# Patient Record
Sex: MALE | Race: WHITE
[De-identification: names, ages, dates, MRNs, and addresses within clinical notes are randomized per-mention and may not be internally consistent; named-entity substitution may affect disease eponyms.]

---

## 2018-04-27 ENCOUNTER — HOSPITAL ENCOUNTER (OUTPATIENT)
Dept: HOSPITAL 56 - MW.ED | Age: 48
LOS: 1 days | Discharge: HOME | End: 2018-04-28
Attending: SURGERY
Payer: COMMERCIAL

## 2018-04-27 DIAGNOSIS — F32.9: ICD-10-CM

## 2018-04-27 DIAGNOSIS — Z88.8: ICD-10-CM

## 2018-04-27 DIAGNOSIS — Z79.899: ICD-10-CM

## 2018-04-27 DIAGNOSIS — K35.80: Primary | ICD-10-CM

## 2018-04-27 DIAGNOSIS — I10: ICD-10-CM

## 2018-04-27 DIAGNOSIS — F41.9: ICD-10-CM

## 2018-04-27 DIAGNOSIS — E78.00: ICD-10-CM

## 2018-04-27 PROCEDURE — 96365 THER/PROPH/DIAG IV INF INIT: CPT

## 2018-04-27 PROCEDURE — 44970 LAPAROSCOPY APPENDECTOMY: CPT

## 2018-04-27 PROCEDURE — 99285 EMERGENCY DEPT VISIT HI MDM: CPT

## 2018-04-27 PROCEDURE — 96368 THER/DIAG CONCURRENT INF: CPT

## 2018-04-27 RX ADMIN — CEFOXITIN SODIUM SCH MLS/HR: 1 INJECTION, SOLUTION INTRAVENOUS at 23:01

## 2018-04-27 NOTE — PCM.PREANE
Preanesthetic Assessment





- Anesthesia/Transfusion/Family Hx


Anesthesia History: Prior Anesthesia Without Reaction


Family History of Anesthesia Reaction: No





- Review of Systems


General: No Symptoms


Pulmonary: No Symptoms


Cardiovascular: No Symptoms


Gastrointestinal: No Symptoms


Neurological: No Symptoms


Other: Reports: None





- Physical Assessment


NPO Status Date: 04/27/18


NPO Status Time: 10:00


O2 Sat by Pulse Oximetry: 97


Respiratory Rate: 15


Vital Signs: 





 Last Vital Signs











Temp  36.4 C   04/27/18 17:25


 


Pulse  88   04/27/18 17:50


 


Resp  15   04/27/18 17:50


 


BP  109/66   04/27/18 17:50


 


Pulse Ox  97   04/27/18 17:50











Height: 1.88 m


Weight: 129.274 kg


Mental Status: Alert & Oriented x3


Airway Class: Mallampati = 2


Dentition: Reports: Normal Dentition


ROM/Head Extension: Full





- Allergies


Allergies/Adverse Reactions: 


 Allergies











Allergy/AdvReac Type Severity Reaction Status Date / Time


 


codeine Allergy  Nausea and Verified 04/27/18 15:05





   Vomiting  


 


morphine Allergy  Nausea and Verified 04/27/18 15:05





   Vomiting  














- Acknowledgements


Anesthesia Type Planned: General Anesthesia


Pt an Appropriate Candidate for the Planned Anesthesia: Yes


Alternatives and Risks of Anesthesia Discussed w Pt/Guardian: Yes


Pt/Guardian Understands and Agrees with Anesthesia Plan: Yes





PreAnesthesia Questionnaire





- Past Health History


Medical/Surgical History: Denies Medical/Surgical History (Other than history 

of HTN, HLD. Surgeries included orthopedic procedures on knee.)


Cardiovascular History: Reports: None, High Cholesterol, Hypertension


Respiratory History: Reports: None


Psychiatric History: Reports: Anxiety, Depression





- Past Surgical History


Musculoskeletal Surgical History: Reports: Arthroscopic Knee





- SUBSTANCE USE


Smoking Status *Q: Never Smoker


Tobacco Use Within Last Twelve Months: Snuff/Dip


Recreational Drug Use History: No





- HOME MEDS


Home Medications: 


 Home Meds





Losartan/Hydrochlorothiazide [Losartan-HCTZ 100-25 MG] 1 tab PO DAILY 04/27/18 [

History]


Sertraline HCl 100 mg PO DAILY 04/27/18 [History]


Simvastatin [Zocor]  04/27/18 [History]


buPROPion HCl [buPROPion SR] 200 mg PO DAILY 04/27/18 [History]


lamoTRIgine [Lamotrigine] 150 mg PO DAILY 04/27/18 [History]











- CURRENT (IN HOUSE) MEDS


Current Meds: 





 Current Medications





Lactated Ringer's (Ringers, Lactated)  1,000 mls @ 125 mls/hr IV ASDIRECTED [facility]


   Last Admin: 04/27/18 15:16 Dose:  125 mls/hr


Lactated Ringer's (Ringers, Lactated)  1,000 mls @ 125 mls/hr IV ASDIRECTED [facility]


Cefoxitin Sodium 1 gm/ Premix  50 mls @ 100 mls/hr IV Q8H [facility]


   Stop: 04/28/18 15:29


Ketorolac Tromethamine (Toradol)  10 mg PO Q6H PRN


   PRN Reason: Pain (moderate 4-6)


   Stop: 05/02/18 17:29





Discontinued Medications





Bupivacaine HCl (Sensorcaine-Mpf 0.5%) Confirm Administered Dose 20 ml .ROUTE 

.STK-MED ONE


   Stop: 04/27/18 15:22


Cefazolin Sodium (Ancef) Confirm Administered Dose 1 gm .ROUTE .STK-MED ONE


   Stop: 04/27/18 15:22


Cefoxitin Sodium (Mefoxin) Confirm Administered Dose 2 gm .ROUTE .STK-MED ONE


   Stop: 04/27/18 15:37


Fentanyl (Sublimaze) Confirm Administered Dose 250 mcg .ROUTE .STK-MED ONE


   Stop: 04/27/18 15:27


Fentanyl (Sublimaze)  100 mcg .ROUTE .STK-MED PRN


   PRN Reason: Pain


Glycopyrrolate (Robinul) Confirm Administered Dose 0.2 mg .ROUTE .STK-MED ONE


   Stop: 04/27/18 15:37


Glycopyrrolate (Robinul) Confirm Administered Dose 0.2 mg .ROUTE .STK-MED ONE


   Stop: 04/27/18 16:27


Cefoxitin Sodium 2 gm/ Premix  50 mls @ 100 mls/hr IV ONETIME ONE


   Stop: 04/27/18 15:33


   Last Admin: 04/27/18 15:16 Dose:  100 mls/hr


Sodium Chloride (Normal Saline) Confirm Administered Dose 20 mls @ as directed 

.ROUTE .STK-MED ONE


   Stop: 04/27/18 15:37


Acetaminophen (Ofirmev) Confirm Administered Dose 100 mls @ as directed IV .STK-

MED ONE


   Stop: 04/27/18 17:31


Ketorolac Tromethamine (Toradol) Confirm Administered Dose 30 mg .ROUTE .STK-

MED ONE


   Stop: 04/27/18 15:37


Lidocaine (Xylocaine-Mpf 2%) Confirm Administered Dose 5 ml .ROUTE .STK-MED ONE


   Stop: 04/27/18 15:37


Midazolam HCl (Versed 1 Mg/Ml) Confirm Administered Dose 2 mg .ROUTE .STK-MED 

ONE


   Stop: 04/27/18 15:27


Ondansetron HCl (Zofran) Confirm Administered Dose 4 mg .ROUTE .STK-MED ONE


   Stop: 04/27/18 15:37


Phenylephrine HCl (Phenylephrine In Ns 100 Mcg/Ml) Confirm Administered Dose 1 

mg .ROUTE .STK-MED ONE


   Stop: 04/27/18 16:16


Phenylephrine HCl (Phenylephrine In Ns 100 Mcg/Ml) Confirm Administered Dose 1 

mg .ROUTE .STK-MED ONE


   Stop: 04/27/18 17:04


Propofol (Diprivan  20 Ml) Confirm Administered Dose 200 mg .ROUTE .STK-MED ONE


   Stop: 04/27/18 15:27


Rocuronium Bromide (Zemuron) Confirm Administered Dose 100 mg .ROUTE .STK-MED 

ONE


   Stop: 04/27/18 15:37


Succinylcholine Chloride (Quelicin) Confirm Administered Dose 200 mg .ROUTE .STK

-MED ONE


   Stop: 04/27/18 15:37

## 2018-04-27 NOTE — EDM.PDOC
<Isiah Franco REYNOLD - Last Filed: 04/27/18 15:14>





ED HPI GENERAL MEDICAL PROBLEM





- General


Chief Complaint: Abdominal Pain


Stated Complaint: ABDOMINAL PAIN


Time Seen by Provider: 04/27/18 15:10


Source of Information: Reports: Patient, EMS, Provider





- History of Present Illness


INITIAL COMMENTS - FREE TEXT/NARRATIVE: 


Mr. Mueller is a 48 yr old male who was transferred to [facility] for 

concerns of acute appendicitis. Early today, around 1am, he suddenly awoke from 

sleep with periumbilical and right lower quadrant abdominal pain. It was sharp 

and constant in nature. He presented to a local ED for evaluation. He was found 

to have a elevated WBC and findings consistent with acute appendicitis on CT 

imaging. He was sent here for surgical evaluation. Continues to have 

periumbilical and right lower quadrant pain. The pain does not radiate elsewhere

, was ggravated on the bumpy ride to our ED and is quite severe. . He denies 

appetite at this time. Some associated nausea and subjective fever/chills. No 

emesis. He does endorse some SOB of breath at with exertion. No chest pain or 

known cardiac problems. No prior abdominal surgeries. He does not take any 

anticoagulants.  He chews tobacco. PMH notable for HTN and HLD. 





  ** Abdominal


Pain Score (Numeric/FACES): 5





- Related Data


 Allergies











Allergy/AdvReac Type Severity Reaction Status Date / Time


 


codeine Allergy  Nausea and Verified 04/27/18 15:05





   Vomiting  


 


morphine Allergy  Nausea and Verified 04/27/18 15:05





   Vomiting  











Home Meds: 


 Home Meds





Losartan/Hydrochlorothiazide [Losartan-HCTZ 100-25 MG] 1 tab PO DAILY 04/27/18 [

History]


Sertraline HCl 100 mg PO DAILY 04/27/18 [History]


Simvastatin [Zocor]  04/27/18 [History]


buPROPion HCl [buPROPion SR] 200 mg PO DAILY 04/27/18 [History]


lamoTRIgine [Lamotrigine] 150 mg PO DAILY 04/27/18 [History]











Past Medical History





- Past Health History


Medical/Surgical History: Denies Medical/Surgical History (Other than history 

of HTN, HLD. Surgeries included orthopedic procedures on knee.)


Cardiovascular History: Reports: None, High Cholesterol, Hypertension


Respiratory History: Reports: None


Psychiatric History: Reports: Anxiety, Depression





- Past Surgical History


Musculoskeletal Surgical History: Reports: Arthroscopic Knee





Social & Family History





- Family History


Family Medical History: Noncontributory





- Tobacco Use


Tobacco Use Within Last Twelve Months: Snuff/Dip





- Recreational Drug Use


Recreational Drug Use: No





ED ROS GENERAL





- Review of Systems


Review Of Systems: See Below


Constitutional: Reports: Fever, Chills, Fatigue, Decreased Appetite


Respiratory: Reports: No Symptoms (No history of asthma or other pulmonary 

problems. )


Cardiovascular: Reports: No Symptoms (Denies chest pain or known cardiac 

problems. )


GI/Abdominal: Reports: Abdominal Pain, Anorexia, Other (GERD symptoms)


: Reports: No Symptoms, Other (No known kidney problems. )


Musculoskeletal: Reports: Joint Pain


Hematologic/Lymphatic: Reports: No Symptoms (No histoy of blood clots or 

bleeding disorders. No anticoagulant use. )





ED EXAM, GI/ABD





- Physical Exam


Exam: See Below


General Appearance: Alert, Other (Appearing in mild discomfort)


Head: Atraumatic, Normocephalic


Neck: Normal Inspection


Respiratory/Chest: No Respiratory Distress, Lungs Clear, Normal Breath Sounds


Cardiovascular: Regular Rate, Rhythm, No Edema


GI/Abdominal Exam: Soft, No Distention, Other (Obsese/protuberant. Tender to 

palpation in periumbilical and RLQ. No rebound. Some guarding noted. )


Extremities: Normal Inspection, No Pedal Edema


Neurological: Alert


Psychiatric: Normal Affect


Skin Exam: Warm, Dry, Normal Color





Course





- Vital Signs


Last Recorded V/S: 





 Last Vital Signs











Temp  98.1 F   04/27/18 15:02


 


Pulse  76   04/27/18 15:02


 


Resp  18   04/27/18 15:02


 


BP  101/53 L  04/27/18 15:02


 


Pulse Ox  95   04/27/18 15:02














- Orders/Labs/Meds


Orders: 





 Active Orders 24 hr











 Category Date Time Status


 


 Antiembolic Devices [RC] PER UNIT ROUTINE Care  04/27/18 15:16 Active


 


 Nathan Catheter Insertion [Insert Urinary Catheter] [OM. Care  04/27/18 15:30 

Ordered





 PC] Q24H   


 


 Oxygen Therapy [RC] PRN Care  04/27/18 15:17 Active


 


 Skin Preparation [RC] .PREOP Care  04/27/18 15:16 Active


 


 Urinary Catheter Assessment [RC] ASDIRECTED Care  04/27/18 15:16 Active


 


 Urinary Catheter Assessment [RC] ASDIRECTED Care  04/27/18 15:17 Active


 


 Vital Signs [RC] PER UNIT ROUTINE Care  04/27/18 15:16 Active


 


 Nothing Per Oral Diet [DIET] Diet  04/27/18 Breakfast Active


 


 Lactated Ringers [Ringers, Lactated] 1,000 ml Med  04/27/18 15:15 Active





 IV ASDIRECTED   


 


 Antiembolic Hose [OM.PC] PER UNIT ROUTINE Oth  04/27/18 06:00 Ordered


 


 Antiembolic Hose [OM.PC] PER UNIT ROUTINE Oth  04/28/18 06:00 Ordered


 


 Sequential Compression Device [OM.PC] Routine Oth  04/27/18 15:16 Ordered


 


 Resuscitation Status Routine Resus Stat  04/27/18 15:16 Ordered








 Medication Orders





Lactated Ringer's (Ringers, Lactated)  1,000 mls @ 125 mls/hr IV ASDIRECTED [facility]


   Last Admin: 04/27/18 15:16  Dose: 125 mls/hr








Meds: 





Medications











Generic Name Dose Route Start Last Admin





  Trade Name Freq  PRN Reason Stop Dose Admin


 


Lactated Ringer's  1,000 mls @ 125 mls/hr  04/27/18 15:15  04/27/18 15:16





  Ringers, Lactated  IV   125 mls/hr





  ASDIRECTED STEPHANIE   Administration





     





     





     





     














Discontinued Medications














Generic Name Dose Route Start Last Admin





  Trade Name Freq  PRN Reason Stop Dose Admin


 


Bupivacaine HCl  Confirm  04/27/18 15:21  





  Sensorcaine-Mpf 0.5%  Administered  04/27/18 15:22  





  Dose   





  20 ml   





  .ROUTE   





  .STK-MED ONE   





     





     





     





     


 


Cefazolin Sodium  Confirm  04/27/18 15:21  





  Ancef  Administered  04/27/18 15:22  





  Dose   





  1 gm   





  .ROUTE   





  .STK-MED ONE   





     





     





     





     


 


Fentanyl  Confirm  04/27/18 15:26  





  Sublimaze  Administered  04/27/18 15:27  





  Dose   





  250 mcg   





  .ROUTE   





  .STK-MED ONE   





     





     





     





     


 


Cefoxitin Sodium 2 gm/ Premix  50 mls @ 100 mls/hr  04/27/18 15:04  04/27/18 15:

16





  IV  04/27/18 15:33  100 mls/hr





  ONETIME ONE   Administration





     





     





     





     


 


Midazolam HCl  Confirm  04/27/18 15:26  





  Versed 1 Mg/Ml  Administered  04/27/18 15:27  





  Dose   





  2 mg   





  .ROUTE   





  .STK-MED ONE   





     





     





     





     


 


Propofol  Confirm  04/27/18 15:26  





  Diprivan  20 Ml  Administered  04/27/18 15:27  





  Dose   





  200 mg   





  .ROUTE   





  .STK-MED ONE   





     





     





     





     














Departure





- Departure


Time of Disposition: 15:10


Disposition: Refer to Observation


Clinical Impression: 


 Appendicitis








- Discharge Information





- My Orders


Last 24 Hours: 





My Active Orders





04/27/18 06:00


Antiembolic Hose [OM.PC] PER UNIT ROUTINE 





04/27/18 15:15


Lactated Ringers [Ringers, Lactated] 1,000 ml IV ASDIRECTED 





04/27/18 15:16


Antiembolic Devices [RC] PER UNIT ROUTINE 


Skin Preparation [RC] .PREOP 


Urinary Catheter Assessment [RC] ASDIRECTED 


Vital Signs [RC] PER UNIT ROUTINE 


Sequential Compression Device [OM.PC] Routine 


Resuscitation Status Routine 





04/27/18 15:17


Oxygen Therapy [RC] PRN 


Urinary Catheter Assessment [RC] ASDIRECTED 





04/27/18 15:30


Nathan Catheter Insertion [Insert Urinary Catheter] [OM.PC] Q24H 





04/27/18 Breakfast


Nothing Per Oral Diet [DIET] 





04/28/18 06:00


Antiembolic Hose [OM.PC] PER UNIT ROUTINE 














- Assessment/Plan


Last 24 Hours: 





My Active Orders





04/27/18 06:00


Antiembolic Hose [OM.PC] PER UNIT ROUTINE 





04/27/18 15:15


Lactated Ringers [Ringers, Lactated] 1,000 ml IV ASDIRECTED 





04/27/18 15:16


Antiembolic Devices [RC] PER UNIT ROUTINE 


Skin Preparation [RC] .PREOP 


Urinary Catheter Assessment [RC] ASDIRECTED 


Vital Signs [RC] PER UNIT ROUTINE 


Sequential Compression Device [OM.PC] Routine 


Resuscitation Status Routine 





04/27/18 15:17


Oxygen Therapy [RC] PRN 


Urinary Catheter Assessment [RC] ASDIRECTED 





04/27/18 15:30


Nathan Catheter Insertion [Insert Urinary Catheter] [OM.PC] Q24H 





04/27/18 Breakfast


Nothing Per Oral Diet [DIET] 





04/28/18 06:00


Antiembolic Hose [OM.PC] PER UNIT ROUTINE 











Assessment:: 


48 yr old male with acute appendicitis. Elevated WBC at 15. Clinical exam, 

history, and CT imaging consistent with appendicitis. PMH notable for HTN, HLD. 

No prior abdominal surgeries. Currently afebrile and hemodynamically stable. 





Plan: 


Proceed to OR with Dr. Heard for laparoscopic, possible open appendectomy 

and all indicated procedures


Preoperative cefoxitin, 2 grams


LR at 125ml/hr


NPO


Consent obtained


Anticipate cares under the surgical service post operatively for observation








<David Heard - Last Filed: 04/27/18 15:38>





ED HPI GENERAL MEDICAL PROBLEM





- General


History Limitations: Reports: No Limitations





- History of Present Illness


Duration: Day(s):


Location: Reports: Abdomen


Quality: Reports: Ache, Pressure


Severity: Moderate


Improves with: Reports: Rest


Worsens with: Reports: Eating, Movement


Associated Symptoms: Reports: Fever/Chills, Nausea/Vomiting (nausea, no vomiting

)





ED ROS GENERAL





- Review of Systems


Review Of Systems: See Below


GI/Abdominal: Reports: Decreased Appetite


: Denies: Flank Pain, Frequency, Hematuria


Skin: Denies: Cyanosis, Jaundice


Neurological: Denies: Confusion, Dizziness


Psychiatric: Reports: Depression


Immunologic: Reports: No Symptoms





ED EXAM, GI/ABD





- Physical Exam


Exam Limited By: No Limitations


General Appearance: Alert, WD/WN


Ears: Normal External Exam


Nose: Normal Inspection


Throat/Mouth: Normal Inspection


Head: Atraumatic, Normocephalic


Neck: Normal Inspection.  No: Carotid Bruit


Respiratory/Chest: No Respiratory Distress, Lungs Clear, Normal Breath Sounds


Cardiovascular: Normal Peripheral Pulses, Regular Rate, Rhythm, No Edema


GI/Abdominal Exam: Soft, No Distention, Guarding, Rebound, Tender, Abnormal 

Bowel Sounds (decreased).  No: Rigid


 (Male) Exam: No Hernia


Rectal (Males) Exam: Deferred


Back Exam: Normal Inspection


Extremities: Normal Inspection, Normal Range of Motion, Normal Capillary Refill


Neurological: Alert, Oriented


Psychiatric: Normal Affect, Normal Mood


Skin Exam: Warm, Dry, Normal Color.  No: Jaundice


Lymphatic: No Adenopathy





- Problem List Review


Problem List Initiated/Reviewed/Updated: Yes





- Assessment/Plan


Plan: 


Laparoscopic appendectomy, possible open appendectomy.  Both operative 

procedures, along with the risks, including, but not limited to, bleeding, 

infection, pneumonia, deep venous thrombosis, pulmonary emboli, myocardial 

infarction, and adjacent organ injury have been reviewed with the patient who 

voices understanding, offers no questions and agrees to proceed.

## 2018-04-27 NOTE — PCM48HPAN
Post Anesthesia Note





- EVALUATION WITHIN 48HRS OF ANESTHETIC


Vital Signs in Normal Range: Yes


Patient Participated in Evaluation: Yes


Respiratory Function Stable: Yes


Airway Patent: Yes


Cardiovascular Function Stable: Yes


Hydration Status Stable: Yes


Pain Control Satisfactory: Yes


Nausea and Vomiting Control Satisfactory: Yes


Mental Status Recovered: Yes


Resp Rate: 15

## 2018-04-27 NOTE — PCM.OPNOTE
- General Post-Op/Procedure Note


Date of Surgery/Procedure: 04/27/18


Operative Procedure(s): Laparoscopic appendectomy


Pre Op Diagnosis: Acute abdomen


Post-Op Diagnosis: Acute nonruptured retrocecal appendicitis


Anesthesia Technique: General ET Tube (ASA IIE)


Primary Surgeon: David Heard


Assistant: Isiah Franco


Fluid Replacement, Intraop: 900


Output, Urine Amount: 700


EBL in mLs: 20


Condition: Good


Free Text/Narrative:: 





Dictation [id number]


CPT CODE 51695

## 2018-04-27 NOTE — PCM.POSTAN
POST ANESTHESIA ASSESSMENT





- MENTAL STATUS


Mental Status: Alert





- RESPIRATORY


Respiratory Status: Respiratory Rate WNL





- CARDIOVASCULAR


CV Status: Pulse Rate WNL





- GASTROINTESTINAL


GI Status: No Symptoms





- POST OP HYDRATION


Hydration Status: Adequate & Stable

## 2018-04-28 RX ADMIN — CEFOXITIN SODIUM SCH MLS/HR: 1 INJECTION, SOLUTION INTRAVENOUS at 06:11

## 2018-04-28 NOTE — PCM.SURGPN
<Isiah Franco - Last Filed: 04/28/18 08:41>





- General Info


Date of Service: 04/28/18


Date of Surgery/Procedure: 04/27/18


POD#: 1


Admission Diagnosis/Problem: Appendicitis





- Review of Systems


Systems Review Comment:: 


Doing well this am. Pain is controlled and tolerable. Slept intermittently 

overnight. Denies nausea, vomiting, fever, chills, chest pain, or SOB. Voiding 

on own. Passing flatus, but no BM yet. Tolerating diet and liquids. No 

questions or concerns. 








- Patient Data


Vitals - Most Recent: 


 Last Vital Signs











Temp  36.3 C   04/28/18 07:40


 


Pulse  85   04/28/18 07:40


 


Resp  16   04/28/18 07:40


 


BP  115/68   04/28/18 07:40


 


Pulse Ox  88 L  04/28/18 07:40











Weight - Most Recent: 285 lb


I&O - Last 24 Hours: 


 Intake & Output











 04/27/18 04/28/18 04/28/18





 22:59 06:59 14:59


 


Intake Total 1900  


 


Output Total 1300  


 


Balance 600  











Med Orders - Current: 


 Current Medications





Lactated Ringer's (Ringers, Lactated)  1,000 mls @ 125 mls/hr IV ASDIRECTED [facility]


   Last Admin: 04/28/18 06:10 Dose:  125 mls/hr


Lactated Ringer's (Ringers, Lactated)  1,000 mls @ 125 mls/hr IV ASDIRECTED [facility]


Cefoxitin Sodium 1 gm/ Premix  50 mls @ 100 mls/hr IV Q8H [facility]


   Stop: 04/28/18 15:29


   Last Admin: 04/28/18 06:11 Dose:  100 mls/hr


Ketorolac Tromethamine (Toradol)  10 mg PO Q6H PRN


   PRN Reason: Pain (moderate 4-6)


   Stop: 05/02/18 17:29


   Last Admin: 04/27/18 21:40 Dose:  10 mg





Discontinued Medications





Bupivacaine HCl (Sensorcaine-Mpf 0.5%) Confirm Administered Dose 20 ml .ROUTE 

.STK-MED ONE


   Stop: 04/27/18 15:22


Cefazolin Sodium (Ancef) Confirm Administered Dose 1 gm .ROUTE .STK-MED ONE


   Stop: 04/27/18 15:22


Cefoxitin Sodium (Mefoxin) Confirm Administered Dose 2 gm .ROUTE .STK-MED ONE


   Stop: 04/27/18 15:37


Fentanyl (Sublimaze) Confirm Administered Dose 250 mcg .ROUTE .STK-MED ONE


   Stop: 04/27/18 15:27


Fentanyl (Sublimaze)  100 mcg .ROUTE .STK-MED PRN


   PRN Reason: Pain


Glycopyrrolate (Robinul) Confirm Administered Dose 0.2 mg .ROUTE .STK-MED ONE


   Stop: 04/27/18 15:37


Glycopyrrolate (Robinul) Confirm Administered Dose 0.2 mg .ROUTE .STK-MED ONE


   Stop: 04/27/18 16:27


Cefoxitin Sodium 2 gm/ Premix  50 mls @ 100 mls/hr IV ONETIME ONE


   Stop: 04/27/18 15:33


   Last Admin: 04/27/18 15:16 Dose:  100 mls/hr


Sodium Chloride (Normal Saline) Confirm Administered Dose 20 mls @ as directed 

.ROUTE .STK-MED ONE


   Stop: 04/27/18 15:37


Acetaminophen (Ofirmev) Confirm Administered Dose 100 mls @ as directed IV .STK-

MED ONE


   Stop: 04/27/18 17:31


Ketorolac Tromethamine (Toradol) Confirm Administered Dose 30 mg .ROUTE .STK-

MED ONE


   Stop: 04/27/18 15:37


Lidocaine (Xylocaine-Mpf 2%) Confirm Administered Dose 5 ml .ROUTE .STK-MED ONE


   Stop: 04/27/18 15:37


Midazolam HCl (Versed 1 Mg/Ml) Confirm Administered Dose 2 mg .ROUTE .STK-MED 

ONE


   Stop: 04/27/18 15:27


Ondansetron HCl (Zofran) Confirm Administered Dose 4 mg .ROUTE .STK-MED ONE


   Stop: 04/27/18 15:37


Phenylephrine HCl (Phenylephrine In Ns 100 Mcg/Ml) Confirm Administered Dose 1 

mg .ROUTE .STK-MED ONE


   Stop: 04/27/18 16:16


Phenylephrine HCl (Phenylephrine In Ns 100 Mcg/Ml) Confirm Administered Dose 1 

mg .ROUTE .STK-MED ONE


   Stop: 04/27/18 17:04


Propofol (Diprivan  20 Ml) Confirm Administered Dose 200 mg .ROUTE .STK-MED ONE


   Stop: 04/27/18 15:27


Rocuronium Bromide (Zemuron) Confirm Administered Dose 100 mg .ROUTE .STK-MED 

ONE


   Stop: 04/27/18 15:37


Succinylcholine Chloride (Quelicin) Confirm Administered Dose 200 mg .ROUTE .STK

-MED ONE


   Stop: 04/27/18 15:37











- Exam


Wound/Incisions: Dressing Dry and Intact, Other (Minimal shadowing at dressing 

sites. )


General: Alert, No Acute Distress


Lungs: Clear to Auscultation, Normal Respiratory Effort


Cardiovascular: Regular Rate, Regular Rhythm


GI/Abdominal Exam: Soft (Appropriately tender. Dressings intact with mild 

shadowing)


Extremities: Non-Tender


Skin: Warm, Dry, Intact





- Problem List Review


Problem List Initiated/Reviewed/Updated: Yes





- My Orders


Last 24 Hours: 


 Active Orders 24 hr











 Category Date Time Status


 


 Admission Status [Patient Status] [ADT] Routine ADT  04/27/18 17:24 Active


 


 Antiembolic Devices [RC] PER UNIT ROUTINE Care  04/27/18 15:16 Active


 


 Nathan Catheter Insertion [Insert Urinary Catheter] [OM. Care  04/27/18 15:30 

Ordered





 PC] Q24H   


 


 Oxygen Therapy [RC] PRN Care  04/27/18 17:23 Active


 


 Pulse Oximetry [RC] ASDIRECTED Care  04/27/18 17:23 Active


 


 RT Incentive Spirometry [RC] Q1HWA Care  04/27/18 17:23 Active


 


 Skin Preparation [RC] .PREOP Care  04/27/18 15:16 Active


 


 Up ad Spring [RC] PER UNIT ROUTINE Care  04/27/18 17:23 Active


 


 Vital Signs [RC] PER UNIT ROUTINE Care  04/27/18 15:16 Active


 


 Vital Signs [RC] PER UNIT ROUTINE Care  04/27/18 17:23 Active


 


 Regular Diet [DIET] Diet  04/28/18 Breakfast Active


 


 Ketorolac [Toradol] Med  04/27/18 17:28 Active





 10 mg PO Q6H PRN   


 


 Lactated Ringers [Ringers, Lactated] 1,000 ml Med  04/27/18 15:15 Active





 IV ASDIRECTED   


 


 Lactated Ringers [Ringers, Lactated] 1,000 ml Med  04/27/18 17:30 Active





 IV ASDIRECTED   


 


 cefOXitin [Mefoxin in Dextrose,Iso-Osm 1 GM/50 ML] 1 gm Med  04/27/18 23:00 

Active





 Premix Bag 1 bag   





 IV Q8H   


 


 Antiembolic Hose [OM.PC] PER UNIT ROUTINE Oth  04/28/18 06:00 Ordered


 


 Sequential Compression Device [OM.PC] Routine Oth  04/27/18 15:16 Ordered


 


 Resuscitation Status Routine Resus Stat  04/27/18 15:16 Ordered








 Medication Orders





Lactated Ringer's (Ringers, Lactated)  1,000 mls @ 125 mls/hr IV ASDIRECTED [facility]


   Last Admin: 04/28/18 06:10  Dose: 125 mls/hr


   Infusion: 04/27/18 23:16  Dose: 125 mls/hr


   Admin: 04/27/18 15:16  Dose: 125 mls/hr


Lactated Ringer's (Ringers, Lactated)  1,000 mls @ 125 mls/hr IV ASDIRECTED [facility]


Cefoxitin Sodium 1 gm/ Premix  50 mls @ 100 mls/hr IV Q8H [facility]


   Stop: 04/28/18 15:29


   Last Admin: 04/28/18 06:11  Dose: 100 mls/hr


   Infusion: 04/27/18 23:31  Dose: 100 mls/hr


   Admin: 04/27/18 23:01  Dose: 100 mls/hr


Ketorolac Tromethamine (Toradol)  10 mg PO Q6H PRN


   PRN Reason: Pain (moderate 4-6)


   Stop: 05/02/18 17:29


   Last Admin: 04/27/18 21:40  Dose: 10 mg











- Assessment


Assessment           (Free Text/Narrative):: 


48 yr old male POD#1 appendectomy for acute non ruptured appendicitis. Doing 

well. Hemodynamically stable. Appropriate for discharge. 








- Plan


Plan                        (Free Text/Narrative):: 


Anticipate discharge home today


PO pain regime on discharge


Saline lock IV


Diet as tolerated 











<David Heard L - Last Filed: 04/28/18 09:16>





- Patient Data


Vitals - Most Recent: 


 Last Vital Signs











Temp  97.4 F   04/28/18 07:40


 


Pulse  85   04/28/18 07:40


 


Resp  16   04/28/18 07:40


 


BP  115/68   04/28/18 07:40


 


Pulse Ox  88 L  04/28/18 07:40











I&O - Last 24 Hours: 


 Intake & Output











 04/27/18 04/28/18 04/28/18





 19:59 03:59 11:59


 


Intake Total 1900  


 


Output Total 1300  


 


Balance 600  











Med Orders - Current: 


 Current Medications





Lactated Ringer's (Ringers, Lactated)  1,000 mls @ 125 mls/hr IV ASDIRECTED [facility]


   Last Admin: 04/28/18 06:10 Dose:  125 mls/hr


Lactated Ringer's (Ringers, Lactated)  [facility]


Cefoxitin Sodium 1 gm/ Premix  50 mls @ 100 mls/hr IV Q8H [facility]


   Stop: 04/28/18 15:29


   Last Admin: 04/28/18 06:11 Dose:  100 mls/hr


Ketorolac Tromethamine (Toradol)  10 mg PO Q6H PRN


   PRN Reason: Pain (moderate 4-6)


   Stop: 05/02/18 17:29


   Last Admin: 04/27/18 21:40 Dose:  10 mg





Discontinued Medications





Bupivacaine HCl (Sensorcaine-Mpf 0.5%) Confirm Administered Dose 20 ml .ROUTE 

.STK-MED ONE


   Stop: 04/27/18 15:22


Cefazolin Sodium (Ancef) Confirm Administered Dose 1 gm .ROUTE .STK-MED ONE


   Stop: 04/27/18 15:22


Cefoxitin Sodium (Mefoxin) Confirm Administered Dose 2 gm .ROUTE .STK-MED ONE


   Stop: 04/27/18 15:37


Fentanyl (Sublimaze) Confirm Administered Dose 250 mcg .ROUTE .STK-MED ONE


   Stop: 04/27/18 15:27


Fentanyl (Sublimaze)  100 mcg .ROUTE .STK-MED PRN


   PRN Reason: Pain


Glycopyrrolate (Robinul) Confirm Administered Dose 0.2 mg .ROUTE .STK-MED ONE


   Stop: 04/27/18 15:37


Glycopyrrolate (Robinul) Confirm Administered Dose 0.2 mg .ROUTE .STK-MED ONE


   Stop: 04/27/18 16:27


Cefoxitin Sodium 2 gm/ Premix  50 mls @ 100 mls/hr IV ONETIME ONE


   Stop: 04/27/18 15:33


   Last Admin: 04/27/18 15:16 Dose:  100 mls/hr


Sodium Chloride (Normal Saline) Confirm Administered Dose 20 mls @ as directed 

.ROUTE .STK-MED ONE


   Stop: 04/27/18 15:37


Acetaminophen (Ofirmev) Confirm Administered Dose 100 mls @ as directed IV .STK-

MED ONE


   Stop: 04/27/18 17:31


Ketorolac Tromethamine (Toradol) Confirm Administered Dose 30 mg .ROUTE .STK-

MED ONE


   Stop: 04/27/18 15:37


Lidocaine (Xylocaine-Mpf 2%) Confirm Administered Dose 5 ml .ROUTE .STK-MED ONE


   Stop: 04/27/18 15:37


Midazolam HCl (Versed 1 Mg/Ml) Confirm Administered Dose 2 mg .ROUTE .STK-MED 

ONE


   Stop: 04/27/18 15:27


Ondansetron HCl (Zofran) Confirm Administered Dose 4 mg .ROUTE .STK-MED ONE


   Stop: 04/27/18 15:37


Phenylephrine HCl (Phenylephrine In Ns 100 Mcg/Ml) Confirm Administered Dose 1 

mg .ROUTE .STK-MED ONE


   Stop: 04/27/18 16:16


Phenylephrine HCl (Phenylephrine In Ns 100 Mcg/Ml) Confirm Administered Dose 1 

mg .ROUTE .STK-MED ONE


   Stop: 04/27/18 17:04


Propofol (Diprivan  20 Ml) Confirm Administered Dose 200 mg .ROUTE .STK-MED ONE


   Stop: 04/27/18 15:27


Rocuronium Bromide (Zemuron) Confirm Administered Dose 100 mg .ROUTE .STK-MED 

ONE


   Stop: 04/27/18 15:37


Succinylcholine Chloride (Quelicin) Confirm Administered Dose 200 mg .ROUTE .STK

-MED ONE


   Stop: 04/27/18 15:37











- Problem List Review


Problem List Initiated/Reviewed/Updated: Yes





- My Orders


Last 24 Hours: 


 Active Orders 24 hr











 Category Date Time Status


 


 Admission Status [Patient Status] [ADT] Routine ADT  04/27/18 17:24 Active


 


 Antiembolic Devices [RC] PER UNIT ROUTINE Care  04/27/18 15:16 Active


 


 Nathan Catheter Insertion [Insert Urinary Catheter] [OM. Care  04/27/18 15:30 

Ordered





 PC] Q24H   


 


 Oxygen Therapy [RC] PRN Care  04/27/18 17:23 Active


 


 Pulse Oximetry [RC] ASDIRECTED Care  04/27/18 17:23 Active


 


 RT Incentive Spirometry [RC] Q1HWA Care  04/27/18 17:23 Active


 


 Skin Preparation [RC] .PREOP Care  04/27/18 15:16 Active


 


 Up ad Spring [RC] PER UNIT ROUTINE Care  04/27/18 17:23 Active


 


 Vital Signs [RC] PER UNIT ROUTINE Care  04/27/18 15:16 Active


 


 Vital Signs [RC] PER UNIT ROUTINE Care  04/27/18 17:23 Active


 


 Regular Diet [DIET] Diet  04/28/18 Breakfast Active


 


 Ketorolac [Toradol] Med  04/27/18 17:28 Active





 10 mg PO Q6H PRN   


 


 Lactated Ringers [Ringers, Lactated] 1,000 ml Med  04/27/18 15:15 Active





 IV ASDIRECTED   


 


 Lactated Ringers [Ringers, Lactated] 1,000 ml Med  04/27/18 17:30 Active





 IV ASDIRECTED   


 


 cefOXitin [Mefoxin in Dextrose,Iso-Osm 1 GM/50 ML] 1 gm Med  04/27/18 23:00 

Active





 Premix Bag 1 bag   





 IV Q8H   


 


 Antiembolic Hose [OM.PC] PER UNIT ROUTINE Oth  04/28/18 06:00 Ordered


 


 Sequential Compression Device [OM.PC] Routine Oth  04/27/18 15:16 Ordered


 


 Resuscitation Status Routine Resus Stat  04/27/18 15:16 Ordered








 Medication Orders





Lactated Ringer's (Ringers, Lactated)  1,000 mls @ 125 mls/hr IV ASDIRECTED [facility]


   Last Admin: 04/28/18 06:10  Dose: 125 mls/hr


   Infusion: 04/27/18 23:16  Dose: 125 mls/hr


   Admin: 04/27/18 15:16  Dose: 125 mls/hr


Lactated Ringer's (Ringers, Lactated)  1,000 mls @ 125 mls/hr IV ASDIRECTED [facility]


Cefoxitin Sodium 1 gm/ Premix  50 mls @ 100 mls/hr IV Q8H [facility]


   Stop: 04/28/18 15:29


   Last Admin: 04/28/18 06:11  Dose: 100 mls/hr


   Infusion: 04/27/18 23:31  Dose: 100 mls/hr


   Admin: 04/27/18 23:01  Dose: 100 mls/hr


Ketorolac Tromethamine (Toradol)  10 mg PO Q6H PRN


   PRN Reason: Pain (moderate 4-6)


   Stop: 05/02/18 17:29


   Last Admin: 04/27/18 21:40  Dose: 10 mg











- Plan


Plan                        (Free Text/Narrative):: 





Patient seen and examined with Dr. Franco.  I agree with his assessment and 

plan.  Patient is ready for discharge.  He will contact my office to see me the 

week of 5/7.  Rx for Tramadol for pain.  Patient instructed not to lift more 

than 25# for the next 6 weeks.  He may return to work this week as he feels 

comfortable.

## 2018-04-28 NOTE — [id number].DCSUM1
<SissyIsiah mota REYNOLD - Last Filed: 04/28/18 09:14>





**Discharge Summary





- Hospital Course


Free Text/Narrative:: 





Mr. Mueller is a 48 yr old male who presented on 4/27/18 with acute 

appendicitis. He was taken to the OR and underwent laparoscopic appendectomy. 

He tolerated the procedure well. Post operatively he was cared for on the 

general medical/surgical floor. POD#1 he was doing well and remained stable. He 

was ambulating, tolerating oral intake, tolerating his pain, and was 

appropriate for discharge. He was discharged in stable condition. 





- Discharge Data


Discharge Date: 04/28/18


Discharge Disposition: Home, Self-Care 01


Condition: Good





- Patient Summary/Data


Operative Procedure(s) Performed: Laparoscopic appendectomy





- Patient Instructions


Diet: Regular Diet as Tolerated


Activity: No Lifting Over 25 Pounds, No Strenuous Activities


Driving: Do Not Drive (Within 24hrs of your procedure. )


Wound/Incision Care: Keep Operative Site/Wound Site Clean and Dry (You may 

remove the outer white/clear dressing 4/29/18. The steri stripes will fall off 

on thier own in 7-10 days. It is okay to shower but do not scrub your 

incisions. Do not soak in water for two weeks. )


Notify Provider of: Fever, Increased Pain, Swelling and Redness, Drainage





- Discharge Plan


Home Medications: 


 Home Meds





Losartan/Hydrochlorothiazide [Losartan-HCTZ 100-25 MG] 1 tab PO DAILY 04/27/18 [

History]


Sertraline HCl 100 mg PO DAILY 04/27/18 [History]


Simvastatin [Zocor]  04/27/18 [History]


buPROPion HCl [buPROPion SR] 200 mg PO DAILY 04/27/18 [History]


lamoTRIgine [Lamotrigine] 150 mg PO DAILY 04/27/18 [History]








Forms:  ED Department Discharge


Referrals: 


PCP,Unknown [Primary Care Provider] - 





- Patient Data


Vitals - Most Recent: 


 Last Vital Signs











Temp  36.3 C   04/28/18 07:40


 


Pulse  85   04/28/18 07:40


 


Resp  16   04/28/18 07:40


 


BP  115/68   04/28/18 07:40


 


Pulse Ox  88 L  04/28/18 07:40











Weight - Most Recent: 285 lb


I&O - Last 24 hours: 


 Intake & Output











 04/27/18 04/28/18 04/28/18





 22:59 06:59 14:59


 


Intake Total 1900  


 


Output Total 1300  


 


Balance 600  











Med Orders - Current: 


 Current Medications





Lactated Ringer's (Ringers, Lactated)  1,000 mls @ 125 mls/hr IV ASDIRECTED [facility]


   Last Admin: 04/28/18 06:10 Dose:  125 mls/hr


Lactated Ringer's (Ringers, Lactated)  1,000 mls @ 125 mls/hr IV ASDIRECTED [facility]


Cefoxitin Sodium 1 gm/ Premix  50 mls @ 100 mls/hr IV Q8H [facility]


   Stop: 04/28/18 15:29


   Last Admin: 04/28/18 06:11 Dose:  100 mls/hr


Ketorolac Tromethamine (Toradol)  10 mg PO Q6H PRN


   PRN Reason: Pain (moderate 4-6)


   Stop: 05/02/18 17:29


   Last Admin: 04/27/18 21:40 Dose:  10 mg





Discontinued Medications





Bupivacaine HCl (Sensorcaine-Mpf 0.5%) Confirm Administered Dose 20 ml .ROUTE 

.STK-MED ONE


   Stop: 04/27/18 15:22


Cefazolin Sodium (Ancef) Confirm Administered Dose 1 gm .ROUTE .STK-MED ONE


   Stop: 04/27/18 15:22


Cefoxitin Sodium (Mefoxin) Confirm Administered Dose 2 gm .ROUTE .STK-MED ONE


   Stop: 04/27/18 15:37


Fentanyl (Sublimaze) Confirm Administered Dose 250 mcg .ROUTE .STK-MED ONE


   Stop: 04/27/18 15:27


Fentanyl (Sublimaze)  100 mcg .ROUTE .STK-MED PRN


   PRN Reason: Pain


Glycopyrrolate (Robinul) Confirm Administered Dose 0.2 mg .ROUTE .STK-MED ONE


   Stop: 04/27/18 15:37


Glycopyrrolate (Robinul) Confirm Administered Dose 0.2 mg .ROUTE .STK-MED ONE


   Stop: 04/27/18 16:27


Cefoxitin Sodium 2 gm/ Premix  50 mls @ 100 mls/hr IV ONETIME ONE


   Stop: 04/27/18 15:33


   Last Admin: 04/27/18 15:16 Dose:  100 mls/hr


Sodium Chloride (Normal Saline) Confirm Administered Dose 20 mls @ as directed 

.ROUTE .STK-MED ONE


   Stop: 04/27/18 15:37


Acetaminophen (Ofirmev) Confirm Administered Dose 100 mls @ as directed IV .STK-

MED ONE


   Stop: 04/27/18 17:31


Ketorolac Tromethamine (Toradol) Confirm Administered Dose 30 mg .ROUTE .STK-

MED ONE


   Stop: 04/27/18 15:37


Lidocaine (Xylocaine-Mpf 2%) Confirm Administered Dose 5 ml .ROUTE .STK-MED ONE


   Stop: 04/27/18 15:37


Midazolam HCl (Versed 1 Mg/Ml) Confirm Administered Dose 2 mg .ROUTE .STK-MED 

ONE


   Stop: 04/27/18 15:27


Ondansetron HCl (Zofran) Confirm Administered Dose 4 mg .ROUTE .STK-MED ONE


   Stop: 04/27/18 15:37


Phenylephrine HCl (Phenylephrine In Ns 100 Mcg/Ml) Confirm Administered Dose 1 

mg .ROUTE .STK-MED ONE


   Stop: 04/27/18 16:16


Phenylephrine HCl (Phenylephrine In Ns 100 Mcg/Ml) Confirm Administered Dose 1 

mg .ROUTE .STK-MED ONE


   Stop: 04/27/18 17:04


Propofol (Diprivan  20 Ml) Confirm Administered Dose 200 mg .ROUTE .STK-MED ONE


   Stop: 04/27/18 15:27


Rocuronium Bromide (Zemuron) Confirm Administered Dose 100 mg .ROUTE .STK-MED 

ONE


   Stop: 04/27/18 15:37


Succinylcholine Chloride (Quelicin) Confirm Administered Dose 200 mg .ROUTE .STK

-MED ONE


   Stop: 04/27/18 15:37











<David Heard L - Last Filed: 04/28/18 09:19>





**Discharge Summary





- Patient Instructions


Showering/Bathing: May Shower


Wound/Incision, Other: May remove dressings on 4/29.





- Discharge Summary/Plan Comment


DC Time >30 min.: No


Discharge Summary/Plan Comment: 





Patient seen and examined with Dr. Franco.  I agree with his assessment and 

plan.





- Patient Data


Vitals - Most Recent: 


 Last Vital Signs











Temp  97.4 F   04/28/18 07:40


 


Pulse  85   04/28/18 07:40


 


Resp  16   04/28/18 07:40


 


BP  115/68   04/28/18 07:40


 


Pulse Ox  88 L  04/28/18 07:40











I&O - Last 24 hours: 


 Intake & Output











 04/27/18 04/28/18 04/28/18





 19:59 03:59 11:59


 


Intake Total 1900  


 


Output Total 1300  


 


Balance 600  











Med Orders - Current: 


 Current Medications





Lactated Ringer's (Ringers, Lactated)  1,000 mls @ 125 mls/hr IV ASDIRECTED [facility]


   Last Admin: 04/28/18 06:10 Dose:  125 mls/hr


Lactated Ringer's (Ringers, Lactated)  1,000 mls @ 125 mls/hr IV ASDIRECTED [facility]


Cefoxitin Sodium 1 gm/ Premix  50 mls @ 100 mls/hr IV Q8H [facility]


   Stop: 04/28/18 15:29


   Last Admin: 04/28/18 06:11 Dose:  100 mls/hr


Ketorolac Tromethamine (Toradol)  10 mg PO Q6H PRN


   PRN Reason: Pain (moderate 4-6)


   Stop: 05/02/18 17:29


   Last Admin: 04/28/18 09:15 Dose:  10 mg





Discontinued Medications





Bupivacaine HCl (Sensorcaine-Mpf 0.5%) Confirm Administered Dose 20 ml .ROUTE 

.STK-MED ONE


   Stop: 04/27/18 15:22


Cefazolin Sodium (Ancef) Confirm Administered Dose 1 gm .ROUTE .STK-MED ONE


   Stop: 04/27/18 15:22


Cefoxitin Sodium (Mefoxin) Confirm Administered Dose 2 gm .ROUTE .STK-MED ONE


   Stop: 04/27/18 15:37


Fentanyl (Sublimaze) Confirm Administered Dose 250 mcg .ROUTE .STK-MED ONE


   Stop: 04/27/18 15:27


Fentanyl (Sublimaze)  100 mcg .ROUTE .STK-MED PRN


   PRN Reason: Pain


Glycopyrrolate (Robinul) Confirm Administered Dose 0.2 mg .ROUTE .STK-MED ONE


   Stop: 04/27/18 15:37


Glycopyrrolate (Robinul) Confirm Administered Dose 0.2 mg .ROUTE .STK-MED ONE


   Stop: 04/27/18 16:27


Cefoxitin Sodium 2 gm/ Premix  50 mls @ 100 mls/hr IV ONETIME ONE


   Stop: 04/27/18 15:33


   Last Admin: 04/27/18 15:16 Dose:  100 mls/hr


Sodium Chloride (Normal Saline) Confirm Administered Dose 20 mls @ as directed 

.ROUTE .STK-MED ONE


   Stop: 04/27/18 15:37


Acetaminophen (Ofirmev) Confirm Administered Dose 100 mls @ as directed IV .STK-

MED ONE


   Stop: 04/27/18 17:31


Ketorolac Tromethamine (Toradol) Confirm Administered Dose 30 mg .ROUTE .STK-

MED ONE


   Stop: 04/27/18 15:37


Lidocaine (Xylocaine-Mpf 2%) Confirm Administered Dose 5 ml .ROUTE .STK-MED ONE


   Stop: 04/27/18 15:37


Midazolam HCl (Versed 1 Mg/Ml) Confirm Administered Dose 2 mg .ROUTE .STK-MED 

ONE


   Stop: 04/27/18 15:27


Ondansetron HCl (Zofran) Confirm Administered Dose 4 mg .ROUTE .STK-MED ONE


   Stop: 04/27/18 15:37


Phenylephrine HCl (Phenylephrine In Ns 100 Mcg/Ml) Confirm Administered Dose 1 

mg .ROUTE .STK-MED ONE


   Stop: 04/27/18 16:16


Phenylephrine HCl (Phenylephrine In Ns 100 Mcg/Ml) Confirm Administered Dose 1 

mg .ROUTE .STK-MED ONE


   Stop: 04/27/18 17:04


Propofol (Diprivan  20 Ml) Confirm Administered Dose 200 mg .ROUTE .STK-MED ONE


   Stop: 04/27/18 15:27


Rocuronium Bromide (Zemuron) Confirm Administered Dose 100 mg .ROUTE .STK-MED 

ONE


   Stop: 04/27/18 15:37


Succinylcholine Chloride (Quelicin) Confirm Administered Dose 200 mg .ROUTE .STK

-MED ONE


   Stop: 04/27/18 15:37

## 2018-04-28 NOTE — [id number]
Post Anesthesia Note





- EVALUATION WITHIN 48HRS OF ANESTHETIC


Vital Signs in Normal Range: Yes


Patient Participated in Evaluation: Yes


Respiratory Function Stable: Yes


Airway Patent: Yes


Cardiovascular Function Stable: Yes


Hydration Status Stable: Yes


Pain Control Satisfactory: Yes


Nausea and Vomiting Control Satisfactory: Yes


Mental Status Recovered: Yes


Resp Rate: 20

## 2018-04-30 NOTE — OR
SURGEON:

David Heard M.D.

 

DATE OF PROCEDURE:  04/27/2018

 

OPERATIVE PERFORMED:

Laparoscopic appendectomy.

 

FIRST ASSISTANT:

Dr. Franco.

 

ANESTHESIA:

General endotracheal.

 

ASA CLASSIFICATION:

IIE.

 

PREOPERATIVE DIAGNOSIS:

Acute abdomen.

 

POSTOPERATIVE DIAGNOSIS:

Acute nonruptured retrocecal appendicitis.

 

INTRAOPERATIVE FLUID REPLACEMENT:

900 mL of crystalloid.

 

INTRAOPERATIVE URINARY OUTPUT:

700 mL.

 

ESTIMATED BLOOD LOSS:

20 mL.

 

DESCRIPTION OF PROCEDURE:

The patient was taken to the operating room and placed on the operating table in

the supine position.  Time-out was called for appropriate identification of the

patient and procedure.  Following satisfactory attainment of general

endotracheal anesthesia, a Nathan catheter was placed in the patient's urinary

bladder.  Thigh-high TEDs and sequential compression boots had previously been

placed.  The abdomen was now prepped with DuraPrep solution.  Sterile drapes

were applied.  The skin just above the umbilicus was infiltrated with 0.5%

Marcaine solution.  A small skin incision was made and deepened into the

subcutaneous tissue, obtaining hemostasis with the use of electrocautery.  The

Veress needle was introduced into the peritoneal cavity.  Saline drop test was

positive.  Carbon dioxide pneumoperitoneum was now established with the release

set at 13 cm of water.  Once we had a satisfactory pneumoperitoneum, 5 mm camera

port was placed through the supraumbilical incision.  The patient was now

positioned head down and slightly rolled to the left.  Under camera vision, a 12

mm suprapubic port was placed.  The skin incision was preemptively infiltrated

with 0.5% Marcaine solution.  The skin incision was made, and then deepened

through the subcutaneous tissue with electrocautery.  The 12 mm port was placed

through that incision.  The patient was now further positioned with his head

down and rolled to the left.  The appendix was acutely inflamed and very

difficult to see.  A third port was placed in the left lower quadrant.  Again,

the skin was infiltrated with 0.5% Marcaine solution before the skin incision

was made.  Once we had the skin incision made, a 5 mm port was placed through

that incision.  I was now able to grasp the cecum; however, the appendix was

very difficult to deliver into the wound and was in a retrocecal position.

Adhesions were taken down, and eventually, we were able to identify and mobilize

the appendix.  The mesoappendix was taken down with the Harmonic scalpel.

Minimal bleeding was noted.  Once the appendix had been completely mobilized,

the appendix was ligated using the Endo-DEVIN stapler with a blue load.  The

appendix was properly placed in an Endopouch to prevent intraperitoneal

contamination.  The right lower quadrant was then copiously irrigated with 500

mL of sterile saline with 1 g of Ancef and another 500 mL of sterile saline.

All fluid was aspirated.  The patient was now turned to a neutral position.

Under camera vision, the Endopouch containing appendix was removed through the

12 mm port.  The left lower quadrant port was removed.  Then, the 12 mm

suprapubic port and finally the 5 mm supraumbilical camera and port were

removed.  The wounds were inspected for hemostasis, and small bleeding sites

were electrocoagulated.  All incisions were closed in 2 layers approximating the

subcutaneous tissue with 3-0 Vicryl, and the skin with subcuticular 4-0

Monocryl.  All incisions were Steri-Stripped and dressed with sterile Tegaderm

pads.

 

Sponge, needle, and instrument counts were all correct.  Nathan catheter was

removed prior to the emergence from anesthesia.  Following emergence from

anesthesia and extubation, the patient was taken to recovery room in

satisfactory condition.

 

[facility]HEL[facility]

DD:  04/27/2018 17:23:07

DT:  04/28/2018 00:49:22

Job #:  [id number]

## 2019-01-07 ENCOUNTER — HOSPITAL ENCOUNTER (EMERGENCY)
Dept: HOSPITAL 56 - MW.ED | Age: 49
Discharge: HOME | End: 2019-01-07
Payer: COMMERCIAL

## 2019-01-07 DIAGNOSIS — E78.00: ICD-10-CM

## 2019-01-07 DIAGNOSIS — I10: ICD-10-CM

## 2019-01-07 DIAGNOSIS — G51.0: Primary | ICD-10-CM

## 2019-01-07 DIAGNOSIS — Z79.899: ICD-10-CM

## 2019-01-07 DIAGNOSIS — F32.9: ICD-10-CM

## 2019-01-07 DIAGNOSIS — Z88.5: ICD-10-CM

## 2019-01-07 DIAGNOSIS — F41.9: ICD-10-CM

## 2019-01-07 NOTE — EDM.PDOC
ED HPI GENERAL MEDICAL PROBLEM





- General


Stated Complaint: NUMB FACE


Time Seen by Provider: 01/07/19 09:45


Source of Information: Reports: Patient


History Limitations: Reports: No Limitations





- History of Present Illness


INITIAL COMMENTS - FREE TEXT/NARRATIVE: 


History of present illness:


[]Patient started having numbness in his left face 8 hours ago. He consulted a 

telemedicine company was recommended that he come to the ER for treatment of 

Bell's palsy.





Review of systems: 


As per history of present illness and below otherwise all systems reviewed and 

negative.





Past medical history: 


As per history of present illness and as reviewed below otherwise 

noncontributory.





Surgical history: 


As per history of present illness and as reviewed below otherwise 

noncontributory.





Social history: 


No reported history of drug or alcohol abuse.





Family history: 


As per history of present illness and as reviewed below otherwise 

noncontributory.





Physical exam:


General: Well developed, well nourished in NAD


HEENT: Atraumatic, normocephalic, pupils reactive, negative for conjunctival 

pallor or scleral icterus, mucous membranes moist, throat clear, neck supple, 

nontender, trachea midline.


Lungs: Clear to auscultation, breath sounds equal bilaterally, chest nontender.


Heart: S1S2, regular, negative for clicks, rubs, or JVD.


Abdomen: NABS, Soft, nondistended, nontender. Negative for masses or 

hepatosplenomegaly. Negative for costovertebral tenderness.


Pelvis: Stable nontender.


Genitourinary: Deferred.


Rectal: Deferred.


Extremities: Atraumatic, negative for cords or calf pain. Neurovascular 

unremarkable.


Neuro: Awake, alert, oriented. Left facial nerve paralysis. Cerebellum 

unremarkable. Motor and sensory unremarkable throughout. Exam nonfocal.


Skin:warm and dry





Diagnostics:


None





Therapeutics:


None





ED Course:


Unremarkable





Impression: 


Bell's palsy left-sided





Prescriptions:


Acyclovir, Medrol Dosepak





Plan:


Follow-up with primary care.





Definitive disposition and diagnosis as appropriate pending reevaluation and 

review of above.





- Related Data


 Allergies











Allergy/AdvReac Type Severity Reaction Status Date / Time


 


codeine Allergy  Nausea and Verified 04/27/18 15:05





   Vomiting  


 


morphine Allergy  Nausea and Verified 04/27/18 15:05





   Vomiting  











Home Meds: 


 Home Meds





Losartan/Hydrochlorothiazide [Losartan-HCTZ 100-25 MG] 1 tab PO DAILY 04/27/18 [

History]


Sertraline HCl 100 mg PO DAILY 04/27/18 [History]


Simvastatin [Zocor]  04/27/18 [History]


lamoTRIgine [Lamotrigine] 150 mg PO DAILY 04/27/18 [History]


Acyclovir [Zovirax] 800 mg PO 5XDAY #50 tab 01/07/19 [Rx]


methylPREDNISolone [Medrol] 4 mg PO ASDIRECTED #1 dosepk 01/07/19 [Rx]











Past Medical History





- Past Health History


Medical/Surgical History: Denies Medical/Surgical History (Other than history 

of HTN, HLD. Surgeries included orthopedic procedures on knee.)


Cardiovascular History: Reports: None, High Cholesterol, Hypertension


Respiratory History: Reports: None


Psychiatric History: Reports: Anxiety, Depression





- Past Surgical History


Musculoskeletal Surgical History: Reports: Arthroscopic Knee





Social & Family History





- Family History


Family Medical History: Noncontributory





ED ROS GENERAL





- Review of Systems


Review Of Systems: ROS reveals no pertinent complaints other than HPI.





ED EXAM, NEURO





- Physical Exam


Exam: See Below (See history of present illness)





Course





- Vital Signs


Last Recorded V/S: 


 Last Vital Signs











Temp  96.3 F   01/07/19 09:50


 


Pulse  65   01/07/19 09:50


 


Resp  18   01/07/19 09:50


 


BP  191/109 H  01/07/19 09:50


 


Pulse Ox  98   01/07/19 09:50














Departure





- Departure


Time of Disposition: 09:56


Disposition: Home, Self-Care 01


Condition: Good


Clinical Impression: 


 Left-sided Bell's palsy








- Discharge Information


*PRESCRIPTION DRUG MONITORING PROGRAM REVIEWED*: No


*COPY OF PRESCRIPTION DRUG MONITORING REPORT IN PATIENT MIRTA: No


Prescriptions: 


Acyclovir [Zovirax] 800 mg PO 5XDAY #50 tab


methylPREDNISolone [Medrol] 4 mg PO ASDIRECTED #1 dosepk


Referrals: 


PCP,Unknown [Primary Care Provider] - 


Additional Instructions: 


The following information is given to patients seen in the emergency department 

who are being discharged to home. This information is to outline your options 

for follow-up care. We provide all patients seen in our emergency department 

with a follow-up referral.





The need for follow-up, as well as the timing and circumstances, are variable 

depending upon the specifics of your emergency department visit.





If you don't have a primary care physician on staff, we will provide you with a 

referral. We always advise you to contact your personal physician following an 

emergency department visit to inform them of the circumstance of the visit and 

for follow-up with them and/or the need for any referrals to a consulting 

specialist.





The emergency department will also refer you to a specialist when appropriate. 

This referral assures that you have the opportunity for follow-up care with a 

specialist. All of these measure are taken in an effort to provide you with 

optimal care, which includes your follow-up.





Under all circumstances we always encourage you to contact your private 

physician who remains a resource for coordinating your care. When calling for 

follow-up care, please make the office aware that this follow-up is from your 

recent emergency room visit. If for any reason you are refused follow-up, 

please contact the [facility] Emergency 

Department at [phone] and asked to speak to the emergency department 

charge nurse.





Medrol dose pack, acyclovir as directed, follow up with primary care, return if 

symptoms worsen or change.





[facility]


Primary Care


[address] [place] [id number]


Phone: [phone]


Fax: [phone]